# Patient Record
Sex: FEMALE | Race: WHITE
[De-identification: names, ages, dates, MRNs, and addresses within clinical notes are randomized per-mention and may not be internally consistent; named-entity substitution may affect disease eponyms.]

---

## 2017-01-30 ENCOUNTER — HOSPITAL ENCOUNTER (EMERGENCY)
Dept: HOSPITAL 62 - ER | Age: 82
Discharge: HOME | End: 2017-01-30
Payer: MEDICARE

## 2017-01-30 VITALS — DIASTOLIC BLOOD PRESSURE: 82 MMHG | SYSTOLIC BLOOD PRESSURE: 160 MMHG

## 2017-01-30 DIAGNOSIS — M79.602: ICD-10-CM

## 2017-01-30 DIAGNOSIS — Z95.2: ICD-10-CM

## 2017-01-30 DIAGNOSIS — Z95.810: ICD-10-CM

## 2017-01-30 DIAGNOSIS — I25.2: ICD-10-CM

## 2017-01-30 DIAGNOSIS — I50.9: ICD-10-CM

## 2017-01-30 DIAGNOSIS — Z85.528: ICD-10-CM

## 2017-01-30 DIAGNOSIS — R07.89: Primary | ICD-10-CM

## 2017-01-30 DIAGNOSIS — E11.9: ICD-10-CM

## 2017-01-30 DIAGNOSIS — I11.0: ICD-10-CM

## 2017-01-30 DIAGNOSIS — E66.9: ICD-10-CM

## 2017-01-30 DIAGNOSIS — E78.00: ICD-10-CM

## 2017-01-30 DIAGNOSIS — Z88.2: ICD-10-CM

## 2017-01-30 DIAGNOSIS — M25.512: ICD-10-CM

## 2017-01-30 LAB
ALBUMIN SERPL-MCNC: 3.4 G/DL (ref 3.5–5)
ALP SERPL-CCNC: 112 U/L (ref 38–126)
ALT SERPL-CCNC: 18 U/L (ref 9–52)
ANION GAP SERPL CALC-SCNC: 11 MMOL/L (ref 5–19)
AST SERPL-CCNC: 18 U/L (ref 14–36)
BASOPHILS # BLD AUTO: 0 10^3/UL (ref 0–0.2)
BASOPHILS NFR BLD AUTO: 0.2 % (ref 0–2)
BILIRUB DIRECT SERPL-MCNC: 0 MG/DL (ref 0–0.3)
BILIRUB SERPL-MCNC: 0.7 MG/DL (ref 0.2–1.3)
BUN SERPL-MCNC: 22 MG/DL (ref 7–20)
CALCIUM: 8.7 MG/DL (ref 8.4–10.2)
CHLORIDE SERPL-SCNC: 105 MMOL/L (ref 98–107)
CK MB SERPL-MCNC: 0.94 NG/ML (ref ?–4.55)
CK SERPL-CCNC: 48 U/L (ref 30–135)
CO2 SERPL-SCNC: 25 MMOL/L (ref 22–30)
CREAT SERPL-MCNC: 1.86 MG/DL (ref 0.52–1.25)
EOSINOPHIL # BLD AUTO: 0.2 10^3/UL (ref 0–0.6)
EOSINOPHIL NFR BLD AUTO: 3.1 % (ref 0–6)
ERYTHROCYTE [DISTWIDTH] IN BLOOD BY AUTOMATED COUNT: 14 % (ref 11.5–14)
GLUCOSE SERPL-MCNC: 99 MG/DL (ref 75–110)
HCT VFR BLD CALC: 41.6 % (ref 36–47)
HGB BLD-MCNC: 13.5 G/DL (ref 12–15.5)
HGB HCT DIFFERENCE: -1.1
LYMPHOCYTES # BLD AUTO: 1.7 10^3/UL (ref 0.5–4.7)
LYMPHOCYTES NFR BLD AUTO: 27.7 % (ref 13–45)
MCH RBC QN AUTO: 28.2 PG (ref 27–33.4)
MCHC RBC AUTO-ENTMCNC: 32.5 G/DL (ref 32–36)
MCV RBC AUTO: 87 FL (ref 80–97)
MONOCYTES # BLD AUTO: 0.5 10^3/UL (ref 0.1–1.4)
MONOCYTES NFR BLD AUTO: 7.4 % (ref 3–13)
NEUTROPHILS # BLD AUTO: 3.8 10^3/UL (ref 1.7–8.2)
NEUTS SEG NFR BLD AUTO: 61.6 % (ref 42–78)
POTASSIUM SERPL-SCNC: 4.5 MMOL/L (ref 3.6–5)
PROT SERPL-MCNC: 7.1 G/DL (ref 6.3–8.2)
RBC # BLD AUTO: 4.8 10^6/UL (ref 3.72–5.28)
SODIUM SERPL-SCNC: 141.2 MMOL/L (ref 137–145)
TROPONIN I SERPL-MCNC: < 0.012 NG/ML
WBC # BLD AUTO: 6.2 10^3/UL (ref 4–10.5)

## 2017-01-30 PROCEDURE — 93010 ELECTROCARDIOGRAM REPORT: CPT

## 2017-01-30 PROCEDURE — 85025 COMPLETE CBC W/AUTO DIFF WBC: CPT

## 2017-01-30 PROCEDURE — 36415 COLL VENOUS BLD VENIPUNCTURE: CPT

## 2017-01-30 PROCEDURE — 84484 ASSAY OF TROPONIN QUANT: CPT

## 2017-01-30 PROCEDURE — 82550 ASSAY OF CK (CPK): CPT

## 2017-01-30 PROCEDURE — 82553 CREATINE MB FRACTION: CPT

## 2017-01-30 PROCEDURE — 93005 ELECTROCARDIOGRAM TRACING: CPT

## 2017-01-30 PROCEDURE — 71010: CPT

## 2017-01-30 PROCEDURE — 80053 COMPREHEN METABOLIC PANEL: CPT

## 2017-01-30 PROCEDURE — 99285 EMERGENCY DEPT VISIT HI MDM: CPT

## 2017-01-31 NOTE — EKG REPORT
SEVERITY:- ABNORMAL ECG -

ATRIAL-SENSED VENTRICULAR-PACED RHYTHM

:

Confirmed by: Jose Mattson 31-Jan-2017 00:02:59

## 2019-01-13 ENCOUNTER — HOSPITAL ENCOUNTER (INPATIENT)
Dept: HOSPITAL 62 - ER | Age: 84
LOS: 2 days | Discharge: HOME HEALTH | DRG: 872 | End: 2019-01-15
Attending: INTERNAL MEDICINE | Admitting: INTERNAL MEDICINE
Payer: MEDICARE

## 2019-01-13 DIAGNOSIS — N18.4: ICD-10-CM

## 2019-01-13 DIAGNOSIS — Z90.5: ICD-10-CM

## 2019-01-13 DIAGNOSIS — R19.7: ICD-10-CM

## 2019-01-13 DIAGNOSIS — R41.82: ICD-10-CM

## 2019-01-13 DIAGNOSIS — I50.22: ICD-10-CM

## 2019-01-13 DIAGNOSIS — I13.0: ICD-10-CM

## 2019-01-13 DIAGNOSIS — E86.0: ICD-10-CM

## 2019-01-13 DIAGNOSIS — N39.0: ICD-10-CM

## 2019-01-13 DIAGNOSIS — Z66: ICD-10-CM

## 2019-01-13 DIAGNOSIS — E87.5: ICD-10-CM

## 2019-01-13 DIAGNOSIS — I25.2: ICD-10-CM

## 2019-01-13 DIAGNOSIS — E11.22: ICD-10-CM

## 2019-01-13 DIAGNOSIS — N17.9: ICD-10-CM

## 2019-01-13 DIAGNOSIS — Z85.528: ICD-10-CM

## 2019-01-13 DIAGNOSIS — E78.5: ICD-10-CM

## 2019-01-13 DIAGNOSIS — Z95.0: ICD-10-CM

## 2019-01-13 DIAGNOSIS — A41.9: Primary | ICD-10-CM

## 2019-01-13 LAB
A TYPE INFLUENZA AG: NEGATIVE
ADD MANUAL DIFF: NO
ALBUMIN SERPL-MCNC: 3.9 G/DL (ref 3.5–5)
ALP SERPL-CCNC: 91 U/L (ref 38–126)
ALT SERPL-CCNC: 91 U/L (ref 9–52)
ANION GAP SERPL CALC-SCNC: 10 MMOL/L (ref 5–19)
ANION GAP SERPL CALC-SCNC: 13 MMOL/L (ref 5–19)
APAP SERPL-MCNC: < 10 UG/ML (ref 10–30)
APPEARANCE UR: (no result)
APTT PPP: YELLOW S
AST SERPL-CCNC: 117 U/L (ref 14–36)
B INFLUENZA AG: NEGATIVE
BARBITURATES UR QL SCN: NEGATIVE
BASE EXCESS BLDV CALC-SCNC: -7.1 MMOL/L
BASOPHILS # BLD AUTO: 0 10^3/UL (ref 0–0.2)
BASOPHILS NFR BLD AUTO: 0.4 % (ref 0–2)
BILIRUB DIRECT SERPL-MCNC: 0.5 MG/DL (ref 0–0.4)
BILIRUB SERPL-MCNC: 1.5 MG/DL (ref 0.2–1.3)
BILIRUB UR QL STRIP: NEGATIVE
BUN SERPL-MCNC: 37 MG/DL (ref 7–20)
BUN SERPL-MCNC: 38 MG/DL (ref 7–20)
CALCIUM: 8.1 MG/DL (ref 8.4–10.2)
CALCIUM: 9.2 MG/DL (ref 8.4–10.2)
CHLORIDE SERPL-SCNC: 108 MMOL/L (ref 98–107)
CHLORIDE SERPL-SCNC: 109 MMOL/L (ref 98–107)
CK SERPL-CCNC: 58 U/L (ref 30–135)
CO2 SERPL-SCNC: 17 MMOL/L (ref 22–30)
CO2 SERPL-SCNC: 20 MMOL/L (ref 22–30)
EOSINOPHIL # BLD AUTO: 0 10^3/UL (ref 0–0.6)
EOSINOPHIL NFR BLD AUTO: 0.1 % (ref 0–6)
ERYTHROCYTE [DISTWIDTH] IN BLOOD BY AUTOMATED COUNT: 14.3 % (ref 11.5–14)
ETHANOL SERPL-MCNC: < 10 MG/DL
GLUCOSE SERPL-MCNC: 169 MG/DL (ref 75–110)
GLUCOSE SERPL-MCNC: 221 MG/DL (ref 75–110)
GLUCOSE UR STRIP-MCNC: NEGATIVE MG/DL
HCO3 BLDV-SCNC: 18 MMOL/L (ref 20–32)
HCT VFR BLD CALC: 42.7 % (ref 36–47)
HGB BLD-MCNC: 14 G/DL (ref 12–15.5)
INR PPP: 1.11
KETONES UR STRIP-MCNC: NEGATIVE MG/DL
LYMPHOCYTES # BLD AUTO: 1 10^3/UL (ref 0.5–4.7)
LYMPHOCYTES NFR BLD AUTO: 17.5 % (ref 13–45)
MCH RBC QN AUTO: 29.4 PG (ref 27–33.4)
MCHC RBC AUTO-ENTMCNC: 32.8 G/DL (ref 32–36)
MCV RBC AUTO: 90 FL (ref 80–97)
METHADONE UR QL SCN: NEGATIVE
MONOCYTES # BLD AUTO: 0.4 10^3/UL (ref 0.1–1.4)
MONOCYTES NFR BLD AUTO: 7.4 % (ref 3–13)
NEUTROPHILS # BLD AUTO: 4.4 10^3/UL (ref 1.7–8.2)
NEUTS SEG NFR BLD AUTO: 74.6 % (ref 42–78)
NITRITE UR QL STRIP: NEGATIVE
PCO2 BLDV: 35.1 MMHG (ref 35–63)
PCP UR QL SCN: NEGATIVE
PH BLDV: 7.33 [PH] (ref 7.3–7.42)
PH UR STRIP: 5 [PH] (ref 5–9)
PLATELET # BLD: 170 10^3/UL (ref 150–450)
POTASSIUM SERPL-SCNC: 3.9 MMOL/L (ref 3.6–5)
POTASSIUM SERPL-SCNC: 6.1 MMOL/L (ref 3.6–5)
PROT SERPL-MCNC: 6.8 G/DL (ref 6.3–8.2)
PROT UR STRIP-MCNC: NEGATIVE MG/DL
PROTHROMBIN TIME: 14.9 SEC (ref 11.4–15.4)
RBC # BLD AUTO: 4.76 10^6/UL (ref 3.72–5.28)
SALICYLATES SERPL-MCNC: < 1 MG/DL (ref 2–20)
SODIUM SERPL-SCNC: 137.6 MMOL/L (ref 137–145)
SODIUM SERPL-SCNC: 139.4 MMOL/L (ref 137–145)
SP GR UR STRIP: 1.01
TOTAL CELLS COUNTED % (AUTO): 100 %
TROPONIN I SERPL-MCNC: 0.06 NG/ML
URINE AMPHETAMINES SCREEN: NEGATIVE
URINE BENZODIAZEPINES SCREEN: NEGATIVE
URINE COCAINE SCREEN: NEGATIVE
URINE MARIJUANA (THC) SCREEN: NEGATIVE
UROBILINOGEN UR-MCNC: NEGATIVE MG/DL (ref ?–2)
WBC # BLD AUTO: 5.9 10^3/UL (ref 4–10.5)

## 2019-01-13 PROCEDURE — 70450 CT HEAD/BRAIN W/O DYE: CPT

## 2019-01-13 PROCEDURE — 87804 INFLUENZA ASSAY W/OPTIC: CPT

## 2019-01-13 PROCEDURE — 36415 COLL VENOUS BLD VENIPUNCTURE: CPT

## 2019-01-13 PROCEDURE — 94640 AIRWAY INHALATION TREATMENT: CPT

## 2019-01-13 PROCEDURE — 96367 TX/PROPH/DG ADDL SEQ IV INF: CPT

## 2019-01-13 PROCEDURE — 96366 THER/PROPH/DIAG IV INF ADDON: CPT

## 2019-01-13 PROCEDURE — 83880 ASSAY OF NATRIURETIC PEPTIDE: CPT

## 2019-01-13 PROCEDURE — 82962 GLUCOSE BLOOD TEST: CPT

## 2019-01-13 PROCEDURE — 96365 THER/PROPH/DIAG IV INF INIT: CPT

## 2019-01-13 PROCEDURE — 83735 ASSAY OF MAGNESIUM: CPT

## 2019-01-13 PROCEDURE — 87086 URINE CULTURE/COLONY COUNT: CPT

## 2019-01-13 PROCEDURE — 80053 COMPREHEN METABOLIC PANEL: CPT

## 2019-01-13 PROCEDURE — 96368 THER/DIAG CONCURRENT INF: CPT

## 2019-01-13 PROCEDURE — 51701 INSERT BLADDER CATHETER: CPT

## 2019-01-13 PROCEDURE — 83690 ASSAY OF LIPASE: CPT

## 2019-01-13 PROCEDURE — 99291 CRITICAL CARE FIRST HOUR: CPT

## 2019-01-13 PROCEDURE — 87186 SC STD MICRODIL/AGAR DIL: CPT

## 2019-01-13 PROCEDURE — 85610 PROTHROMBIN TIME: CPT

## 2019-01-13 PROCEDURE — 82550 ASSAY OF CK (CPK): CPT

## 2019-01-13 PROCEDURE — 81001 URINALYSIS AUTO W/SCOPE: CPT

## 2019-01-13 PROCEDURE — 84484 ASSAY OF TROPONIN QUANT: CPT

## 2019-01-13 PROCEDURE — 96375 TX/PRO/DX INJ NEW DRUG ADDON: CPT

## 2019-01-13 PROCEDURE — 87493 C DIFF AMPLIFIED PROBE: CPT

## 2019-01-13 PROCEDURE — C1751 CATH, INF, PER/CENT/MIDLINE: HCPCS

## 2019-01-13 PROCEDURE — 82803 BLOOD GASES ANY COMBINATION: CPT

## 2019-01-13 PROCEDURE — 83605 ASSAY OF LACTIC ACID: CPT

## 2019-01-13 PROCEDURE — 93005 ELECTROCARDIOGRAM TRACING: CPT

## 2019-01-13 PROCEDURE — 83036 HEMOGLOBIN GLYCOSYLATED A1C: CPT

## 2019-01-13 PROCEDURE — 80048 BASIC METABOLIC PNL TOTAL CA: CPT

## 2019-01-13 PROCEDURE — 82140 ASSAY OF AMMONIA: CPT

## 2019-01-13 PROCEDURE — 93010 ELECTROCARDIOGRAM REPORT: CPT

## 2019-01-13 PROCEDURE — 71045 X-RAY EXAM CHEST 1 VIEW: CPT

## 2019-01-13 PROCEDURE — 02HV33Z INSERTION OF INFUSION DEVICE INTO SUPERIOR VENA CAVA, PERCUTANEOUS APPROACH: ICD-10-PCS | Performed by: EMERGENCY MEDICINE

## 2019-01-13 PROCEDURE — 87088 URINE BACTERIA CULTURE: CPT

## 2019-01-13 PROCEDURE — 80307 DRUG TEST PRSMV CHEM ANLYZR: CPT

## 2019-01-13 PROCEDURE — 93306 TTE W/DOPPLER COMPLETE: CPT

## 2019-01-13 PROCEDURE — 84443 ASSAY THYROID STIM HORMONE: CPT

## 2019-01-13 PROCEDURE — 85025 COMPLETE CBC W/AUTO DIFF WBC: CPT

## 2019-01-13 PROCEDURE — 87040 BLOOD CULTURE FOR BACTERIA: CPT

## 2019-01-13 RX ADMIN — DOCUSATE SODIUM SCH: 100 CAPSULE, LIQUID FILLED ORAL at 19:24

## 2019-01-13 NOTE — ER DOCUMENT REPORT
ED General





- General


Chief Complaint: Altered Mental Status


Stated Complaint: TROUBLE BREATHING


Time Seen by Provider: 01/13/19 06:40


TRAVEL OUTSIDE OF THE [place] IN LAST 30 DAYS: No





- HPI


Patient complains to provider of: Altered mental status


Notes: 





Patient was brought in for altered mental status.  According EMS called for 

altered mental state.  Fortunately initial evaluation there is no family at 

bedside.  Patient upon my evaluation looks to be sleeping arouses to voice is 

able to answer simple yes/no questions however is confused to date time and 

year.  Patient also also repeats herself.  Patient is tachycardic upon my 

evaluation.  Patient is very adamant that she is in no pain no chest pain 

abdominal pain patient denies any fever chills nausea vomiting diarrhea.








Family later arrives and is able to add to the HPI process.  States that the 

patient has not been eating or drinking hardly like her normal self for the past

24 hours.  States the patient mostly can take care of herself at home usually is

a no x3.





A brief review of the patient's past medical records available in [facility] was 

performed





- Related Data


Allergies/Adverse Reactions: 


                                        





iodine [Iodine] Allergy (Verified 06/24/15 11:33)


   BREATHING


nitrofurantoin macrocrystalline [From Macrodantin] Allergy (Verified 06/24/15 

11:33)


   RASH


Sulfa (Sulfonamide Antibiotics) Allergy (Verified 06/24/15 11:33)


   FAINTED











Past Medical History





- Social History


Smoking Status: Unknown if Ever Smoked


Family History: Reviewed & Not Pertinent


Patient has suicidal ideation: No


Patient has homicidal ideation: No





- Past Medical History


Cardiac Medical History: Reports: Hx Congestive Heart Failure, Hx Heart Attack -

4, Hx Hypercholesterolemia, Hx Hypertension - COREG


Pulmonary Medical History: 


   Denies: Hx Asthma


Neurological Medical History: Denies: Hx Cerebrovascular Accident, Hx Seizures


Endocrine Medical History: Reports: Hx Diabetes Mellitus Type 2


Renal/ Medical History: Denies: Hx Peritoneal Dialysis


Malignancy Medical History: Reports: Hx Renal (Kidney) Cancer


GI Medical History: Denies: Hx Hepatitis, Hx Hiatal Hernia, Hx Ulcer


Musculoskeletal Medical History: Reports Hx Arthritis


Infectious Medical History: Denies: Hx Hepatitis


Past Surgical History: Reports: Hx Cardiac Surgery - pacer/defib placement, Hx 

Kidney (Renal Surgery) - Left nephrectomy, Hx Open Heart Surgery - CABG, CHF, Hx

Pacemaker.  Denies: Hx Hysterectomy, Hx Mastectomy





- Immunizations


Hx Diphtheria, Pertussis, Tetanus Vaccination: No


Hx Pneumococcal Vaccination: 01/01/12





Review of Systems





- Review of Systems


-: Yes ROS unobtainable due to patient's medical condition - Altered mental 

state





Physical Exam





- Vital signs


Vitals: 


                                        











Resp Pulse Ox


 


 22 H  95 


 


 01/13/19 06:29  01/13/19 06:29











Interpretation: Tachycardic





- General


General appearance: Appears well, Alert





- HEENT


Head: Normocephalic, Atraumatic


Eyes: Normal


Pupils: PERRL





- Respiratory


Respiratory status: No respiratory distress


Chest status: Nontender


Breath sounds: Normal


Chest palpation: Normal





- Cardiovascular


Rhythm: Tachycardia


Heart sounds: Normal auscultation


Murmur: No





- Abdominal


Inspection: Normal


Distension: No distension


Bowel sounds: Normal


Tenderness: Nontender


Organomegaly: No organomegaly





- Back


Back: Normal, Nontender





- Extremities


General upper extremity: Normal inspection, Nontender, Normal color, Normal ROM,

Normal temperature


General lower extremity: Normal inspection, Nontender, Normal color, Normal ROM,

Normal temperature, Normal weight bearing.  No: Carlos's sign





- Neurological


Neuro grossly intact: Yes


Cognition: Normal


Ricardo Coma Scale Eye Opening: Spontaneous


[place] Coma Scale Verbal: Confused


[place] Coma Scale Motor: Obeys Commands


[place] Coma Scale Total: 14


Speech: Normal


Motor strength normal: LUE, RUE, LLE, RLE


Sensory: Normal





- Psychological


Associated symptoms: Normal affect, Normal mood





- Skin


Skin Temperature: Warm


Skin Moisture: Dry


Skin Color: Normal





Course





- Re-evaluation


Re-evalutation: 





01/13/19 14:22


Ms. laboratory states that shows significant signs of hyperkalemia.  EKG did 

show upon response with widening.  Patient was initially treated with IV calcium

gluconate upon initiation of the IV calcium gluconate patient became 

significantly hypotensive with systolics in the 40s.  This was stopped patient 

reevaluated EKGs not show any acute changes again reevaluation patient denied 

any pain denies any chest pain or abdominal pain.  IV fluids were administered 

with subsequent slight improvement of her blood pressure to the 80s and hence to

systolics in the 90s.  After long discussion with the family member stated that 

they did want medical treatment at that time agreed to no intubation but did 

agree with administration of Levophed and placement of a central line.  This was

placed as noted.  Patient continued to improve her blood pressure with levo fed.

 They also continue to gently hydrate the patient as he does have a history of 

CHF.  Discussed with the hospital staff will admit the patient to general 

medical floor as after the hospitalist did discuss the patient's prognosis and 

medical conditions with the family decided to make the family member a DNR.  

Levophed was withheld.  More likely patient has underlying sepsis due to urinary

tract infection





- Vital Signs


Vital signs: 


                                        











Temp Pulse Resp BP Pulse Ox


 


 97.6 F      19   129/78 H  100 


 


 01/13/19 12:29     01/13/19 13:41  01/13/19 13:41  01/13/19 13:41














- Laboratory


Result Diagrams: 


                                 01/13/19 06:45





                                 01/13/19 09:19


Laboratory results interpreted by me: 


                                        











  01/13/19 01/13/19 01/13/19





  06:45 06:45 06:45


 


RDW  14.3 H  


 


VBG HCO3   


 


Potassium   6.1 H* 


 


Chloride   108 H 


 


Carbon Dioxide   17 L 


 


BUN   37 H 


 


Creatinine   2.92 H 


 


Est GFR ( Amer)   18 L 


 


Est GFR (Non-Af Amer)   15 L 


 


Glucose   169 H 


 


POC Glucose   


 


Calcium   


 


Total Bilirubin   1.5 H 


 


Direct Bilirubin   0.5 H 


 


AST   117 H 


 


ALT   91 H 


 


Ammonia   


 


NT-Pro-B Natriuret Pep    00103 H


 


Lipase   


 


Urine Blood   


 


Ur Leukocyte Esterase   


 


Salicylates   


 


Acetaminophen   














  01/13/19 01/13/19 01/13/19





  06:45 06:45 06:45


 


RDW   


 


VBG HCO3  18.0 L  


 


Potassium   


 


Chloride   


 


Carbon Dioxide   


 


BUN   


 


Creatinine   


 


Est GFR ( Amer)   


 


Est GFR (Non-Af Amer)   


 


Glucose   


 


POC Glucose   


 


Calcium   


 


Total Bilirubin   


 


Direct Bilirubin   


 


AST   


 


ALT   


 


Ammonia   < 8.7 L 


 


NT-Pro-B Natriuret Pep   


 


Lipase    309.9 H


 


Urine Blood   


 


Ur Leukocyte Esterase   


 


Salicylates   


 


Acetaminophen   














  01/13/19 01/13/19 01/13/19





  06:45 07:12 07:23


 


RDW   


 


VBG HCO3   


 


Potassium   


 


Chloride   


 


Carbon Dioxide   


 


BUN   


 


Creatinine   


 


Est GFR ( Amer)   


 


Est GFR (Non-Af Amer)   


 


Glucose   


 


POC Glucose    165 H


 


Calcium   


 


Total Bilirubin   


 


Direct Bilirubin   


 


AST   


 


ALT   


 


Ammonia   


 


NT-Pro-B Natriuret Pep   


 


Lipase   


 


Urine Blood   MODERATE H 


 


Ur Leukocyte Esterase   MODERATE H 


 


Salicylates  < 1.0 L  


 


Acetaminophen  < 10 L  














  01/13/19 01/13/19





  08:08 09:19


 


RDW  


 


VBG HCO3  


 


Potassium  


 


Chloride   109 H


 


Carbon Dioxide   20 L


 


BUN   38 H


 


Creatinine   2.91 H


 


Est GFR ( Amer)   18 L


 


Est GFR (Non-Af Amer)   15 L


 


Glucose   221 H


 


POC Glucose  164 H 


 


Calcium   8.1 L


 


Total Bilirubin  


 


Direct Bilirubin  


 


AST  


 


ALT  


 


Ammonia  


 


NT-Pro-B Natriuret Pep  


 


Lipase  


 


Urine Blood  


 


Ur Leukocyte Esterase  


 


Salicylates  


 


Acetaminophen  














Procedures





- Central Line


  ** Right Internal jugular


Consent obtained: Yes


Central line pre-insertion: Sterile PPE donned, Betadine prep applied, 

Chloraprep applied, Sterile drapes applied


Central line lumen type: Triple


Anesthetic type: 1% Lidocaine


mL's of anesthesia: 2


Ultrasound guided: Yes


CM at insertion site: 20


Line secured with sutures: Yes


Central line post-insertion: Blood return from lumens, Sutured, Sterile dressing

applied, Position confirmed w/ CXR


Number of attempts: 2 - Initially tried left side however had difficulty passing

the wire


Complications: No





Critical Care Note





- Critical Care Note


Total time excluding time spent on procedures (mins): 50


Comments: 





Multiple evaluation for patient with sepsis and  [id number]





Discharge





- Discharge


Clinical Impression: 


 Urinary tract infection, Coronary artery disease, Sepsis, Diabetes, Acute on 

chronic kidney disease, Congestive heart failure (CHF), Pacemaker





Condition: Fair


Disposition: ADMITTED AS INPATIENT


Admitting Provider: Hospitalist - [place]


Unit Admitted: Medical Floor

## 2019-01-13 NOTE — RADIOLOGY REPORT (SQ)
CLINICAL HISTORY:  ams 



COMPARISON: None.



TECHNIQUE: XR CHEST 1 VIEW 1/13/2019 7:12 AM CST



FINDINGS: 



The heart is moderately enlarged following sternotomy. Left AICD

is in place.Lungs are clear without consolidation, atelectasis,

mass or edema. There is no pleural effusion. There is no

pneumothorax. There are no acute osseous findings.



IMPRESSION: 



Clear lungs.

## 2019-01-13 NOTE — RADIOLOGY REPORT (SQ)
EXAM DESCRIPTION:  CHEST SINGLE VIEW



COMPLETED DATE/TIME:  1/13/2019 9:49 am



REASON FOR STUDY:  CENTRAL LINE PLACEMENT



COMPARISON:  Chest radiograph earlier the same day



EXAM PARAMETERS:  NUMBER OF VIEWS: One view.

TECHNIQUE: Single frontal radiographic view of the chest acquired.

RADIATION DOSE: NA

LIMITATIONS: None.



FINDINGS:  LUNGS AND PLEURA: No opacities, masses or pneumothorax. No pleural effusion.

MEDIASTINUM AND HILAR STRUCTURES: No masses.  Contour normal.

HEART AND VASCULAR STRUCTURES: Heart enlarged.  No overt failure.

BONES: No acute findings.

HARDWARE: New venous access catheter via right IJ approach.  Tip at cavoatrial junction.  Other cardi
ac hardware stable.

OTHER: No other significant finding.



IMPRESSION:  New venous access catheter tip at the expected location.  No pneumothorax.



TECHNICAL DOCUMENTATION:  JOB ID:  [id number]

 2011 [employer]- All Rights Reserved



Reading location - IP/workstation name: [email or web address]

## 2019-01-13 NOTE — EKG REPORT
SEVERITY:- ABNORMAL ECG -

ATRIAL-SENSED VENTRICULAR-PACED RHYTHM

:

Confirmed by: Rajendra Peng MD 13-Jan-2019 20:34:45

## 2019-01-13 NOTE — EKG REPORT
SEVERITY:- ABNORMAL ECG -

SINUS TACHYCARDIA

LEFT BUNDLE BRANCH BLOCK

:

Confirmed by: Rajendra Peng MD 13-Jan-2019 08:46:55

## 2019-01-13 NOTE — EKG REPORT
SEVERITY:- ABNORMAL ECG -

SINUS TACHYCARDIA

FIRST DEGREE AV BLOCK

LEFT BUNDLE BRANCH BLOCK

:

Confirmed by: Rajendra Peng MD 13-Jan-2019 08:46:36

## 2019-01-13 NOTE — RADIOLOGY REPORT (SQ)
EXAM DESCRIPTION:  CT HEAD WITHOUT



COMPLETED DATE/TIME:  1/13/2019 10:37 am



REASON FOR STUDY:  ams



COMPARISON:  5/4/2014



TECHNIQUE:  Axial images acquired through the brain without intravenous contrast.  Images reviewed wi
th bone, brain and subdural windows.  Additional sagittal and coronal reconstructions were generated.
 Images stored on PACS.

All CT scanners at this facility use dose modulation, iterative reconstruction, and/or weight based d
osing when appropriate to reduce radiation dose to as low as reasonably achievable (ALARA).

[facility]: Dose Right  [facility]: CareDose    [facility]: Dose Right    [facility]: Teradose 4D    [facility]: Smart Opathica



RADIATION DOSE:  CT Rad equipment meets quality standard of care and radiation dose reduction techniq
ues were employed. CTDIvol: 48.6 mGy. DLP: 905 mGy-cm.mGy.



LIMITATIONS:  None.



FINDINGS:  VENTRICLES: Prominent.

CEREBRUM: No masses.  No hemorrhage.  No midline shift.  Areas of low density in the white matter mos
t likely due to chronic micro-vascular ischemic change.  No evidence for acute infarction.  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
 parietal infarct.

CEREBELLUM: No masses.  No hemorrhage.  No alteration of density.  No evidence for acute infarction.

EXTRAAXIAL SPACES: Age-related involutional change.  No fluid collections.  No masses.

ORBITS AND GLOBE: No intra- or extraconal masses.  Normal contour of globe without masses.

CALVARIUM: No fracture.

PARANASAL SINUSES: No fluid or mucosal thickening.

SOFT TISSUES: No mass or hematoma.

OTHER: No other significant finding.



IMPRESSION:  CHRONIC CHANGES OF ATROPHY AND MICROVASCULAR ISCHEMIA.  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  NO 
ACUTE PROCESS.

EVIDENCE OF ACUTE STROKE: NO.



TECHNICAL DOCUMENTATION:  JOB ID:  [id number]

Quality ID # 436: Final reports with documentation of one or more dose reduction techniques (e.g., Au
tomated exposure control, adjustment of the mA and/or kV according to patient size, use of iterative 
reconstruction technique)

 2011 [employer]- All Rights Reserved



Reading location - IP/workstation name: [email or web address]

## 2019-01-13 NOTE — PDOC H&P
History of Present Illness


Admission Date/PCP: 


  01/13/19 12:07





  





Patient complains of: Family brought her to the ER with complaints of diarrhea 

and dehydration.


History of Present Illness: 


IRENE FREGOSO is a 87 year old female with history of congestive heart failure 

with defibrillator/pacemaker, coronary artery disease with a stent placements, 

renal carcinoma status post left kidney removal, history of hypertension and 

diabetes came to the emergency room actually brought to the emergency room after

she was less responsive and slid off the couch according to the son she also has

large loose stools this morning EMS was called brought to the emergency room for

further evaluation.  The workup was done in the ER found to have a UTI and 

potassium of 6.1 calcium gluconate was given in the ER followed by dropping the 

blood pressures patient was stopped started on Levophed and IV fluids given 

gentle IV fluids because of the underlying history of congestive heart failure 

and medical consult was called for admission.  I went to talk to the patient in 

the emergency room she is alert and awake communicating very well son is at 

bedside no confusion at all initially the ER physician thought patient was in 

altered mental status as mentioned above by the time and went to see the patient

she is back to her baseline.  Patient denies any history of fever but given the 

cough for the last 3-4 days whitish sputum no nausea no vomiting one episode of 

large loose stool this morning no headaches no falls no dizziness.  Denies any 

changes in the appetite.  She has this chronic rash on her face as per the 

family it was there for a long time may be at least few months.


Last living well situation with the patient she admitted that she does not have 

a living well but both sons have a power of  privileges.  Patient and 

son wants to be DNR/DNI the document was signed and given to the given to the 

nurse charge.





Past Medical History


Cardiac Medical History: Reports: Congestive Heart Failure, Myocardial 

Infarction - 4, Hyperlipidema, Hypertension - COREG


Pulmonary Medical History: 


   Denies: Asthma


Neurological Medical History: 


   Denies: Seizures


Endocrine Medical History: Reports: Diabetes Mellitus Type 2


Malignancy Medical History: Reports: Renal (Kidney) Cancer


GI Medical History: 


   Denies: Hepatitis, Hiatal Hernia


Musculoskeltal Medical History: Reports: Arthritis


Hematology: 


   Denies: Anemia, Sickle Cell Disease





Past Surgical History


Past Surgical History: Reports: Cholecystectomy, Pacemaker


   Denies: Amputation, Hysterectomy, Mastectomy





Social History


Smoking Status: Never Smoker


Frequency of Alcohol Use: None


Hx Recreational Drug Use: No


Hx Prescription Drug Abuse: No





- Advance Directive


Resuscitation Status: Do Not Resuscitate





Family History


Family History: Reviewed & Not Pertinent


Parental Family History Reviewed: Yes - Of the sons has a lymphoma.


Children Family History Reviewed: Yes


Sibling(s) Family History Reviewed.: Yes





Medication/Allergy


Allergies/Adverse Reactions: 


                                        





iodine [Iodine] Allergy (Verified 06/24/15 11:33)


   BREATHING


nitrofurantoin macrocrystalline [From Macrodantin] Allergy (Verified 06/24/15 

11:33)


   RASH


Sulfa (Sulfonamide Antibiotics) Allergy (Verified 06/24/15 11:33)


   FAINTED











Physical Exam


Vital Signs: 


                                        











Temp Pulse Resp BP Pulse Ox


 


 97.4 F      18   84/66 L  97 


 


 01/13/19 09:25     01/13/19 12:11  01/13/19 12:11  01/13/19 12:11








                                 Intake & Output











 01/12/19 01/13/19 01/14/19





 06:59 06:59 06:59


 


Intake Total   532


 


Balance   532


 


Weight  76.1 kg 











General appearance: PRESENT: no acute distress


Head exam: PRESENT: atraumatic


Eye exam: PRESENT: PERRLA


Mouth exam: PRESENT: dry mucosa


Neck exam: ABSENT: carotid bruit, JVD, lymphadenopathy, thyromegaly


Respiratory exam: PRESENT: clear to auscultation ernesto.  ABSENT: rales, rhonchi, 

wheezes


Cardiovascular exam: PRESENT: RRR.  ABSENT: diastolic murmur, rubs, systolic 

murmur


GI/Abdominal exam: PRESENT: normal bowel sounds, soft.  ABSENT: distended, 

guarding, mass, organolmegaly, rebound, tenderness


Extremities exam: PRESENT: full ROM.  ABSENT: calf tenderness, clubbing, pedal e

sloan


Neurological exam: PRESENT: alert, awake, oriented to person, oriented to place,

oriented to time, oriented to situation, CN II-XII grossly intact.  ABSENT: 

motor sensory deficit


Psychiatric exam: PRESENT: appropriate affect, normal mood.  ABSENT: homicidal 

ideation, suicidal ideation


Skin exam: PRESENT: rash





Results


Laboratory Results: 


                                        





                                 01/13/19 06:45 





                                 01/13/19 09:19 





                                        











  01/13/19 01/13/19 01/13/19





  06:45 06:45 06:45


 


WBC  5.9  


 


RBC  4.76  


 


Hgb  14.0  


 


Hct  42.7  


 


MCV  90  


 


MCH  29.4  


 


MCHC  32.8  


 


RDW  14.3 H  


 


Plt Count  170  


 


Seg Neutrophils %  74.6  


 


Lymphocytes %  17.5  


 


Monocytes %  7.4  


 


Eosinophils %  0.1  


 


Basophils %  0.4  


 


Absolute Neutrophils  4.4  


 


Absolute Lymphocytes  1.0  


 


Absolute Monocytes  0.4  


 


Absolute Eosinophils  0.0  


 


Absolute Basophils  0.0  


 


VBG pH   


 


VBG pCO2   


 


VBG HCO3   


 


VBG Base Excess   


 


Sodium   137.6 


 


Potassium   6.1 H* 


 


Chloride   108 H 


 


Carbon Dioxide   17 L 


 


Anion Gap   13 


 


BUN   37 H 


 


Creatinine   2.92 H 


 


Est GFR ( Amer)   18 L 


 


Est GFR (Non-Af Amer)   15 L 


 


Glucose   169 H 


 


Lactic Acid    2.1


 


Calcium   9.2 


 


Total Bilirubin   1.5 H 


 


AST   117 H 


 


ALT   91 H 


 


Alkaline Phosphatase   91 


 


Ammonia   


 


Total Protein   6.8 


 


Albumin   3.9 


 


Lipase   


 


Urine Color   


 


Urine Appearance   


 


Urine pH   


 


Ur Specific Gravity   


 


Urine Protein   


 


Urine Glucose (UA)   


 


Urine Ketones   


 


Urine Blood   


 


Urine Nitrite   


 


Ur Leukocyte Esterase   


 


Urine WBC (Auto)   


 


Urine RBC (Auto)   














  01/13/19 01/13/19 01/13/19





  06:45 06:45 06:45


 


WBC   


 


RBC   


 


Hgb   


 


Hct   


 


MCV   


 


MCH   


 


MCHC   


 


RDW   


 


Plt Count   


 


Seg Neutrophils %   


 


Lymphocytes %   


 


Monocytes %   


 


Eosinophils %   


 


Basophils %   


 


Absolute Neutrophils   


 


Absolute Lymphocytes   


 


Absolute Monocytes   


 


Absolute Eosinophils   


 


Absolute Basophils   


 


VBG pH  7.33  


 


VBG pCO2  35.1  


 


VBG HCO3  18.0 L  


 


VBG Base Excess  -7.1  


 


Sodium   


 


Potassium   


 


Chloride   


 


Carbon Dioxide   


 


Anion Gap   


 


BUN   


 


Creatinine   


 


Est GFR ( Amer)   


 


Est GFR (Non-Af Amer)   


 


Glucose   


 


Lactic Acid   


 


Calcium   


 


Total Bilirubin   


 


AST   


 


ALT   


 


Alkaline Phosphatase   


 


Ammonia   < 8.7 L 


 


Total Protein   


 


Albumin   


 


Lipase    309.9 H


 


Urine Color   


 


Urine Appearance   


 


Urine pH   


 


Ur Specific Gravity   


 


Urine Protein   


 


Urine Glucose (UA)   


 


Urine Ketones   


 


Urine Blood   


 


Urine Nitrite   


 


Ur Leukocyte Esterase   


 


Urine WBC (Auto)   


 


Urine RBC (Auto)   














  01/13/19 01/13/19 01/13/19





  07:12 09:19 11:08


 


WBC   


 


RBC   


 


Hgb   


 


Hct   


 


MCV   


 


MCH   


 


MCHC   


 


RDW   


 


Plt Count   


 


Seg Neutrophils %   


 


Lymphocytes %   


 


Monocytes %   


 


Eosinophils %   


 


Basophils %   


 


Absolute Neutrophils   


 


Absolute Lymphocytes   


 


Absolute Monocytes   


 


Absolute Eosinophils   


 


Absolute Basophils   


 


VBG pH   


 


VBG pCO2   


 


VBG HCO3   


 


VBG Base Excess   


 


Sodium   139.4 


 


Potassium   3.9  D 


 


Chloride   109 H 


 


Carbon Dioxide   20 L 


 


Anion Gap   10 


 


BUN   38 H 


 


Creatinine   2.91 H 


 


Est GFR ( Amer)   18 L 


 


Est GFR (Non-Af Amer)   15 L 


 


Glucose   221 H 


 


Lactic Acid    1.2


 


Calcium   8.1 L 


 


Total Bilirubin   


 


AST   


 


ALT   


 


Alkaline Phosphatase   


 


Ammonia   


 


Total Protein   


 


Albumin   


 


Lipase   


 


Urine Color  YELLOW  


 


Urine Appearance  SLIGHTLY-CLOUDY  


 


Urine pH  5.0  


 


Ur Specific Gravity  1.013  


 


Urine Protein  NEGATIVE  


 


Urine Glucose (UA)  NEGATIVE  


 


Urine Ketones  NEGATIVE  


 


Urine Blood  MODERATE H  


 


Urine Nitrite  NEGATIVE  


 


Ur Leukocyte Esterase  MODERATE H  


 


Urine WBC (Auto)  29  


 


Urine RBC (Auto)  10  








                                        











  01/13/19 01/13/19





  06:45 06:45


 


Creatine Kinase  58 


 


Troponin I   0.056


 


NT-Pro-B Natriuret Pep   70083 H











Impressions: 


                                        





Chest X-Ray  01/13/19 07:12


IMPRESSION: 


 


Clear lungs.


 








Head CT  01/13/19 07:13


IMPRESSION:  CHRONIC CHANGES OF ATROPHY AND MICROVASCULAR ISCHEMIA.  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 

parietal infarct.  NO ACUTE PROCESS.


EVIDENCE OF ACUTE STROKE: NO.


 














Assessment & Plan





- Diagnosis


(1) Urinary tract infection


Is this a current diagnosis for this admission?: Yes   


Plan: 


1/13/2019 plan today is to place the patient in the medical floor because she is

DNR/DNI lactic acid is going to repeat at this moment this evening initial 

lactic acid level is 1.2.  Patient was started on IV Rocephin 1 g daily and 

levofloxacin to 50 mg IV daily because of the creatinine close to 4.0.  Urine 

cultures blood cultures are pending.  Sepsis protocol was initiated.








(2) Sepsis


Is this a current diagnosis for this admission?: Yes   


Plan: 


1/13/2019 patient became hypotensive in the emergency room she was started on 

Levophed she is also became tachycardic altered mental status was noticed by the

ER physician.  So with this information patient's meets the criteria for sepsis 

protocol.  Cultures urine culture was sent and patient was started on IV 

Rocephin 1 g daily and levofloxacin to 50 mg IV daily and repeat lactic acid is 

pending.  Was started on gentle IV fluids at 50 cc/h because of the history of 

underlying congestive heart failure.








(3) Chronic kidney disease, stage 4, severely decreased GFR


Is this a current diagnosis for this admission?: Yes   


Plan: 


1/13/2019 patient has history of renal cell carcinoma with left nephrectomy.  

Creatinine was 2.9 with a GFR of 15.0 I am going to place a consult for 

nephrology to follow tomorrow.








(4) Congestive heart failure (CHF)


Is this a current diagnosis for this admission?: Yes   


Plan: 


1/13/2019 family and the patient is given the history of congestive heart 

failure with a pacemaker defibrillator we do not have any recent echocardiogram 

going to request for the echocardiogram today.








- Time


Time Spent: 50 to 70 Minutes


Critical Time spent with patient: 15-24 minutes


Medications reviewed and adjusted accordingly: Yes


Anticipated discharge: Home

## 2019-01-14 LAB
ADD MANUAL DIFF: NO
ALBUMIN SERPL-MCNC: 2.5 G/DL (ref 3.5–5)
ALP SERPL-CCNC: 59 U/L (ref 38–126)
ALT SERPL-CCNC: 242 U/L (ref 9–52)
ANION GAP SERPL CALC-SCNC: 6 MMOL/L (ref 5–19)
AST SERPL-CCNC: 252 U/L (ref 14–36)
BASOPHILS # BLD AUTO: 0 10^3/UL (ref 0–0.2)
BASOPHILS NFR BLD AUTO: 0.2 % (ref 0–2)
BILIRUB DIRECT SERPL-MCNC: 0.4 MG/DL (ref 0–0.4)
BILIRUB SERPL-MCNC: 0.6 MG/DL (ref 0.2–1.3)
BUN SERPL-MCNC: 42 MG/DL (ref 7–20)
CALCIUM: 7.9 MG/DL (ref 8.4–10.2)
CHLORIDE SERPL-SCNC: 114 MMOL/L (ref 98–107)
CO2 SERPL-SCNC: 21 MMOL/L (ref 22–30)
EOSINOPHIL # BLD AUTO: 0 10^3/UL (ref 0–0.6)
EOSINOPHIL NFR BLD AUTO: 0.1 % (ref 0–6)
ERYTHROCYTE [DISTWIDTH] IN BLOOD BY AUTOMATED COUNT: 13.9 % (ref 11.5–14)
GLUCOSE SERPL-MCNC: 87 MG/DL (ref 75–110)
HCT VFR BLD CALC: 33.1 % (ref 36–47)
HGB BLD-MCNC: 11 G/DL (ref 12–15.5)
LYMPHOCYTES # BLD AUTO: 1 10^3/UL (ref 0.5–4.7)
LYMPHOCYTES NFR BLD AUTO: 20.6 % (ref 13–45)
MCH RBC QN AUTO: 29.5 PG (ref 27–33.4)
MCHC RBC AUTO-ENTMCNC: 33.4 G/DL (ref 32–36)
MCV RBC AUTO: 89 FL (ref 80–97)
MONOCYTES # BLD AUTO: 0.3 10^3/UL (ref 0.1–1.4)
MONOCYTES NFR BLD AUTO: 5.8 % (ref 3–13)
NEUTROPHILS # BLD AUTO: 3.6 10^3/UL (ref 1.7–8.2)
NEUTS SEG NFR BLD AUTO: 73.3 % (ref 42–78)
PLATELET # BLD: 122 10^3/UL (ref 150–450)
POTASSIUM SERPL-SCNC: 4.2 MMOL/L (ref 3.6–5)
PROT SERPL-MCNC: 5 G/DL (ref 6.3–8.2)
RBC # BLD AUTO: 3.74 10^6/UL (ref 3.72–5.28)
SODIUM SERPL-SCNC: 140.7 MMOL/L (ref 137–145)
TOTAL CELLS COUNTED % (AUTO): 100 %
WBC # BLD AUTO: 5 10^3/UL (ref 4–10.5)

## 2019-01-14 RX ADMIN — CARVEDILOL SCH MG: 6.25 TABLET, FILM COATED ORAL at 09:58

## 2019-01-14 RX ADMIN — ENOXAPARIN SODIUM SCH: 30 INJECTION SUBCUTANEOUS at 10:00

## 2019-01-14 RX ADMIN — CEFTRIAXONE SCH MLS/HR: 1 INJECTION, SOLUTION INTRAVENOUS at 10:11

## 2019-01-14 RX ADMIN — BISACODYL PRN MG: 5 TABLET, COATED ORAL at 10:00

## 2019-01-14 RX ADMIN — LEVOFLOXACIN SCH MLS/HR: 250 INJECTION, SOLUTION INTRAVENOUS at 10:01

## 2019-01-14 RX ADMIN — GUAIFENESIN SCH MG: 600 TABLET, EXTENDED RELEASE ORAL at 21:40

## 2019-01-14 RX ADMIN — DOCUSATE SODIUM SCH MG: 100 CAPSULE, LIQUID FILLED ORAL at 09:59

## 2019-01-14 RX ADMIN — ASPIRIN SCH MG: 81 TABLET, COATED ORAL at 09:58

## 2019-01-14 RX ADMIN — DOCUSATE SODIUM SCH: 100 CAPSULE, LIQUID FILLED ORAL at 17:29

## 2019-01-14 RX ADMIN — CARVEDILOL SCH MG: 6.25 TABLET, FILM COATED ORAL at 21:39

## 2019-01-14 NOTE — XCELERA REPORT
[address] [address]

                               Tel: [phone]

                               Fax: [phone]



                      Transthoracic Echocardiogram Report

_______________________________________________________________________________



Name: IRENE FREGOSO

MRN: [id number]                           Age: 87 yrs

Gender: Female                            : 1931

Patient Status: Inpatient                 [address]

Account #: [id number]

Study Date: 2019 01:03 PM

Accession #: [id number]

_______________________________________________________________________________





_______________________________________________________________________________

Procedure: A two-dimensional transthoracic echocardiogram with color flow and

Doppler was performed. Study Quality: Poor. Poor endocarrdial visualisation.No

'true apical 2 chamber views.Poor doppler interogation.

Reason For Study: chf



History: CHF.

Ordering Physician: CHRIST NOVA

Performed By: Remigio Ross

_______________________________________________________________________________





Interpretation Summary

Poor endocarrdial visualisation.No 'true apical 2 chamber views.Poor doppler

interogation.

Of the walls seen thereis severely reduced LVEF 25% to 30%.No LVH.Mildly

diated LV size.There is a glimpse of a Pacemaer or AICD lead in the RV,There

is mild MR.Unable to interpret any other valve pathology.No True apical 2

chamber  views obtained.Hence cannot comment on the apical anterior , the

basal anterior, the basal inferior and apical inferior walls.The mid

anterior , the mid inferior and the rest of the LV walls are severely

hypoknetic.



MMode/2D Measurements & Calculations

RVDd: 1.9 cm        LVIDd: 6.0 cm FS: 15.6 %           Ao root diam: 2.8 cm

IVSd: 0.94 cm       LVIDs: 5.0 cm EDV(Teich): 177.5 ml Ao root area: 6.0 cm2

                    LVPWd: 1.1 cm ESV(Teich): 120.1 ml

                                  EF(Teich): 32.3 %    LA dimension: 2.8 cm

        _______________________________________________________________



LVOT diam: 2.2 cm

LVOT area: 3.9 cm2



Doppler Measurements & Calculations

MV E max maddie:       MV P1/2t max maddie:     Ao V2 max:       LV V1 max P.4 cm/sec        121.3 cm/sec          92.1 cm/sec      1.3 mmHg

                    MV P1/2t: 77.5 msec   Ao max PG:       LV V1 mean PG:

                                          3.8 mmHg         1.1 mmHg

                    MVA(P1/2t): 2.8 cm2   Ao V2 mean:      LV V1 max:

                    MV dec slope:         82.9 cm/sec      54.5 cm/sec

                    458.5 cm/sec2         Ao mean PG:      LV V1 mean:

                    MV dec time: 0.12 sec 3.2 mmHg         49.8 cm/sec

                                          Ao V2 VTI:       LV V1 VTI: 12.3 cm

                                          21.3 cm



                                          SOCRATES(I,D): 2.2 cm2

                                          SOCRATES(V,D): 2.3 cm2



        _______________________________________________________________

MR max maddie:         SV(LVOT): 47.7 ml     PA V2 max:       TR max maddie:

350.7 cm/sec                              71.7 cm/sec      226.2 cm/sec

MR max PG:                                PA max PG:       TR max P.2 mmHg                                 2.2 mmHg         20.5 mmHg

        _______________________________________________________________

MV P1/2t-pr_phl:

77.5 msec





Left Ventricle

Of the walls seen thereis severely reduced LVEF 25% to 30%.No LVH.Mildly

diated LV size.There is a glimpse of a Pacemaer or AICD lead in the RV,There

is mild MR.Unable to interpret any other valve pathology.No True apical 2

chamber  views obtained.Hence cannot comment on the apical anterior , the

basal anterior, the basal inferior and apical inferior walls.The mid

anterior , the mid inferior and the rest of the LV walls are severely

hypoknetic.



_______________________________________________________________________________



_______________________________________________________________________________

Electronically signed by:      Kelley Oswald      on 2019 06:26 PM



CC: CHRIST NOVA

>

Kelley Oswald

## 2019-01-14 NOTE — PDOC PROGRESS REPORT
Subjective


Progress Note for:: 01/14/19


Subjective:: 





87-year-old female admitted with hypotension sepsis and UTI that was blood 

pressure is improved to 1255/62 and T-max is 98.  Cultures are negative so far. 

Patient is able to participate in the physical therapy today.  No acute events 

in the last 24 hours.  Afebrile.  Comfortably in the bed communicating very 

well.  Complaining of slight cough requesting Mucinex.


Reason For Visit: 


SEPSIS








Physical Exam


Vital Signs: 


                                        











Temp Pulse Resp BP Pulse Ox


 


 98.0 F   73   14   125/62   96 


 


 01/14/19 08:05  01/14/19 08:05  01/14/19 08:05  01/14/19 08:05  01/14/19 08:05








                                 Intake & Output











 01/13/19 01/14/19 01/15/19





 06:59 06:59 06:59


 


Intake Total  1582 


 


Balance  1582 


 


Weight 76.1 kg 79.7 kg 











General appearance: PRESENT: no acute distress


Head exam: PRESENT: atraumatic


Eye exam: PRESENT: PERRLA


Mouth exam: PRESENT: moist


Neck exam: ABSENT: carotid bruit, JVD, lymphadenopathy, thyromegaly


Respiratory exam: PRESENT: clear to auscultation ernesto.  ABSENT: rales, rhonchi, 

wheezes


Cardiovascular exam: PRESENT: RRR.  ABSENT: diastolic murmur, rubs, systolic 

murmur


GI/Abdominal exam: PRESENT: normal bowel sounds, soft.  ABSENT: distended, 

guarding, mass, organolmegaly, rebound, tenderness


Extremities exam: PRESENT: full ROM.  ABSENT: calf tenderness, clubbing, pedal 

edema


Neurological exam: PRESENT: alert, awake, oriented to person, oriented to place,

oriented to time, oriented to situation, CN II-XII grossly intact.  ABSENT: 

motor sensory deficit


Psychiatric exam: PRESENT: appropriate affect, normal mood.  ABSENT: homicidal 

ideation, suicidal ideation





Results


Laboratory Results: 


                                        





                                 01/14/19 04:00 





                                 01/14/19 04:00 





                                        











  01/13/19 01/13/19 01/14/19





  11:08 15:02 04:00


 


WBC    5.0


 


RBC    3.74


 


Hgb    11.0 L D


 


Hct    33.1 L


 


MCV    89


 


MCH    29.5


 


MCHC    33.4


 


RDW    13.9


 


Plt Count    122 L


 


Seg Neutrophils %    73.3


 


Lymphocytes %    20.6


 


Monocytes %    5.8


 


Eosinophils %    0.1


 


Basophils %    0.2


 


Absolute Neutrophils    3.6


 


Absolute Lymphocytes    1.0


 


Absolute Monocytes    0.3


 


Absolute Eosinophils    0.0


 


Absolute Basophils    0.0


 


Sodium   


 


Potassium   


 


Chloride   


 


Carbon Dioxide   


 


Anion Gap   


 


BUN   


 


Creatinine   


 


Est GFR ( Amer)   


 


Est GFR (Non-Af Amer)   


 


Glucose   


 


Lactic Acid  1.2  0.6 L 


 


Calcium   


 


Magnesium   


 


Total Bilirubin   


 


AST   


 


ALT   


 


Alkaline Phosphatase   


 


Total Protein   


 


Albumin   


 


TSH   














  01/14/19 01/14/19





  04:00 04:00


 


WBC  


 


RBC  


 


Hgb  


 


Hct  


 


MCV  


 


MCH  


 


MCHC  


 


RDW  


 


Plt Count  


 


Seg Neutrophils %  


 


Lymphocytes %  


 


Monocytes %  


 


Eosinophils %  


 


Basophils %  


 


Absolute Neutrophils  


 


Absolute Lymphocytes  


 


Absolute Monocytes  


 


Absolute Eosinophils  


 


Absolute Basophils  


 


Sodium  140.7 


 


Potassium  4.2 


 


Chloride  114 H 


 


Carbon Dioxide  21 L 


 


Anion Gap  6 


 


BUN  42 H 


 


Creatinine  2.44 H 


 


Est GFR ( Amer)  23 L 


 


Est GFR (Non-Af Amer)  19 L 


 


Glucose  87 


 


Lactic Acid  


 


Calcium  7.9 L 


 


Magnesium  1.7 


 


Total Bilirubin  0.6 


 


AST  252 H 


 


ALT  242 H 


 


Alkaline Phosphatase  59 


 


Total Protein  5.0 L 


 


Albumin  2.5 L 


 


TSH   1.15








                                        











  01/13/19 01/13/19





  06:45 06:45


 


Creatine Kinase  58 


 


Troponin I   0.056


 


NT-Pro-B Natriuret Pep   32432 H











Impressions: 


                                        





Chest X-Ray  01/13/19 07:12


IMPRESSION: 


 


Clear lungs.


 








Head CT  01/13/19 07:13


IMPRESSION:  CHRONIC CHANGES OF ATROPHY AND MICROVASCULAR ISCHEMIA.  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 

parietal infarct.  NO ACUTE PROCESS.


EVIDENCE OF ACUTE STROKE: NO.


 














Assessment & Plan





- Diagnosis


(1) Urinary tract infection


Is this a current diagnosis for this admission?: Yes   


Plan: 


1/13/2019 plan today is to place the patient in the medical floor because she is

DNR/DNI lactic acid is going to repeat at this moment this evening initial 

lactic acid level is 1.2.  Patient was started on IV Rocephin 1 g daily and 

levofloxacin to 500 mg IV daily because of the creatinine close to 4.0.  Urine 

cultures blood cultures are pending.  Sepsis protocol was initiated.





1/14/2019 patient is getting IV Rocephin 1 g daily levofloxacin 500 mg IV daily.

 Urine cultures and blood cultures are pending.  T-max is 98 today.  Hypotension

is resolved.  Plan is to continue the present management.








(2) Sepsis


Is this a current diagnosis for this admission?: Yes   


Plan: 


1/13/2019 patient became hypotensive in the emergency room she was started on 

Levophed she is also became tachycardic altered mental status was noticed by the

ER physician.  So with this information patient's meets the criteria for sepsis 

protocol.  Cultures urine culture was sent and patient was started on IV R

ocephin 1 g daily and levofloxacin to 50 mg IV daily and repeat lactic acid is 

pending.  Was started on gentle IV fluids at 50 cc/h because of the history of 

underlying congestive heart failure.





1/14/2019-patient was admitted with sepsis hypotension tachycardia altered 

mental status.  Altered mental status is resolved hypotension is resolved 

patient's heart rate is 73 today.  On IV Rocephin 1 g daily levofloxacin 500 mg 

IV daily repeat lactic acid level is 0.6.  Plan is to continue the present 

management.








(3) Chronic kidney disease, stage 4, severely decreased GFR


Is this a current diagnosis for this admission?: Yes   


Plan: 


1/13/2019 patient has history of renal cell carcinoma with left nephrectomy.  

Creatinine was 2.9 with a GFR of 15.0 I am going to place a consult for 

nephrology to follow tomorrow.








1/14/2019 patient has history of left kidney removal secondary to renal cancer. 

Admission creatinine is 2.9 improved to 2.44 with IV fluids acute on chronic 

kidney injury is resolving.  Plan is to repeat the labs tomorrow.








(4) Congestive heart failure (CHF)


Is this a current diagnosis for this admission?: Yes   


Plan: 


1/13/2019 family and the patient is given the history of congestive heart 

failure with a pacemaker defibrillator we do not have any recent echocardiogram 

going to request for the echocardiogram today.





1/14/2019-patient has history of congestive heart failure patient is not in 

fluid overload chest was clear no pedal edema, awaiting for the echocardiogram 

report.








(5) Abnormal EKG


Is this a current diagnosis for this admission?: Yes   


Plan: 


1/14/2019 patient EKG shows first-degree heart block, tachycardia ,and left 

bundle branch block.








- Time


Time Spent with patient: 15-24 minutes


Medications reviewed and adjusted accordingly: Yes


Anticipated discharge: Home

## 2019-01-14 NOTE — PDOC CONSULTATION
Consultation


Consult Date: 19


Attending physician:: CHRIST NOVA


Consult reason:: I was asked to see this patient because of worsening kidney 

function with history of left nephrectomy previously.





History of Present Illness


Admission Date/PCP: 


  19 12:07





  





History of Present Illness: 


IRENE FREGOSO is a 87 year old female with history of coronary artery disease, 

congestive heart failure, hypertension, hyperlipidemia, renal cell carcinoma 

status post left nephrectomy many years ago who was brought in to the emergency 

room by EMS because of initial thought of unresponsiveness and possible 

dehydration with diarrhea.  Patient's caregiver Pamela is on bedside during this

evaluation.  She knows the patient because she comes to take care of her 3 times

a week.  According to her the son said that he heard a yell early morning and 

when he came to check on her in her room she was unresponsive in her recliner.  

There was also a lot of loose stools all over the place.  Her caregiver Pamela 

thought that she could be dehydrated because she has not really been drinking 

much fluids including water.  Patient was then brought into the emergency room 

and was found to have hypotension with blood pressure as low as 84/66.  Initial 

evaluation also showed urinary tract infection and possibility of sepsis.  Her 

potassium was also elevated initially at 6.1.  She also has elevated  BUN of 37,

creatinine of 2.92 with estimated GFR of 15 yesterday.  Today she has a BUN of 

42 and creatinine of 2.44 with estimated GFR of 19.  Patient was given gentle IV

fluid hydration since admission.  She was started on IV antibiotics.  For the 

hypotension he was initially given some Levoped in the emergency room which she 

is currently off now.  Her chest x-ray is negative.  Urinalysis again showed vimal

dence of UTI with moderate blood, and moderate leukocytes with urine culture now

growing gram-negative rods pending sensitivity today the patient said she is 

feeling fine and she could not remember anything at all from yesterday.  She 

denies any nausea or vomiting.  She still has a little bit of a loose stool but 

her C. difficile is negative.  Her appetite is also not the greatest.  Her 

caregiver said that she was feeling tired and has a little bit of cough last 

Friday.  Otherwise patient denies any chest pains, shortness of breath, nausea, 

vomiting nor any other complaints.  She denies any history of hepatitis nor use 

of any nonsteroidal anti-inflammatory agents.


Patient had her left nephrectomy many years ago for renal cell carcinoma.  She 

was not treated with either chemo or radiation therapy after surgery.  She has 

been following up with [facility] at least yearly being the next appointment in on 

April of this year.  From previous records here in the hospital her last blood 

work for kidney function was done around  and it looks like her creatinine 

ranges anywhere between 1.8-2.14 with estimated GFR between 22-26.  Urine output

is not currently quantified.








Past Medical History


Cardiac Medical History: Reports: CHF-Systolic, Coronary Artery Disease, 

Hyperlipidemia, Hypertension-primary, Myocardial Infarction - x 4


Endocrine Medical History: Reports: Diabetes Mellitus Type 2 - Previously 

treated but was told she no longer needs treatment for this


Renal/ Medical History: Reports: Chronic Kidney Disease Stage IV


Malignancy Medical History: Reports: Renal (Kidney) Cancer - Status post left 

nephrectomy


Musculoskeltal Medical History: Reports: Arthritis





Past Surgical History


Past Surgical History: Reports: Cholecystectomy, Nephrectomy - Left for renal 

cell carcinoma, Pacemaker





Social History


Information Source: Patient, Friend


Smoking Status: Former Smoker


Frequency of Alcohol Use: None


Hx Recreational Drug Use: No


Hx Prescription Drug Abuse: No


Past Social History Note: 





Her son lives with her





- Advance Directive


Resuscitation Status: Do Not Resuscitate





Family History


Family History: CAD - Her father  of heart attack, brother also had an MI


Parental Family History Reviewed: Yes


Children Family History Reviewed: Yes


Sibling(s) Family History Reviewed.: Yes





Medication/Allergy


Home Medications: 








Aspirin [Adult Low Dose Aspirin EC] 81 mg PO QAM 19 


Atorvastatin Calcium [Lipitor 40 mg Tablet] 40 mg PO QHS 19 


Carvedilol [Coreg 6.25 mg Tablet] 6.25 mg PO Q12 19 


Cholecalciferol (Vitamin D3) [Vitamin D3 1000 Unit Tablet] 1,000 unit PO QAM 

19 


Nitroglycerin [Nitrostat 0.4 mg (1/150 Gr) Tabs 25/Bottle] 0.4 mg SL Q5MP PRN 

19 








Allergies/Adverse Reactions: 


                                        





iodine [Iodine] Allergy (Verified 06/24/15 11:33)


   BREATHING


nitrofurantoin macrocrystalline [From Macrodantin] Allergy (Verified 06/24/15 

11:33)


   RASH


Sulfa (Sulfonamide Antibiotics) Allergy (Verified 06/24/15 11:33)


   FAINTED











Review of Systems


All systems: reviewed and no additional remarkable complaints except as stated


Review of Systems: 


Constitutional: ABSENT: chills, fatigue, fever(s), headache(s), weight gain, 

weight loss; admits decreased appetite


Eyes: ABSENT: visual disturbances


Ears: ABSENT: hearing changes


Cardiovascular: ABSENT: chest pain, dyspnea on exertion, edema, orthropnea, 

palpitations


Respiratory: ABSENT: cough, dyspnea, hemoptysis


Gastrointestinal: ABSENT: abdominal pain, constipation, hematemesis, hemat

ochezia, nausea, vomiting; admits to having loose stools


Genitourinary: ABSENT: dysuria, hematuria


Musculoskeletal: ABSENT: joint swelling


Integumentary: ABSENT: rash, wounds


Neurological: ABSENT: abnormal gait, abnormal speech, confusion, dizziness, 

focal weakness, numbness, syncope


Psychiatric: ABSENT: anxiety, depression


Endocrine: ABSENT: cold intolerance, heat intolerance, polydipsia, polyuria


Hematologic/Lymphatic: ABSENT: easy bleeding, easy bruising, lymphadenopathy





Physical Exam


Vital Signs: 


                                        











Temp Pulse Resp BP Pulse Ox


 


 98.5 F   74   18   114/51 L  98 


 


 19 11:39  19 14:00  19 11:39  19 11:39  19 11:39








                                 Intake & Output











 01/13/19 01/14/19 01/15/19





 06:59 06:59 06:59


 


Intake Total  1582 337


 


Balance  1582 337


 


Weight 76.1 kg 79.7 kg 











Exam: 





General appearance: No acute distress, cooperative, well-developed, well-

nourished


Head exam: PRESENT: atraumatic, normocephalic


Eye exam: PRESENT: Conjunctiva pale, EOMI, PERRLA.  ABSENT: conjunctival 

injection, scleral icterus


Mouth exam: PRESENT: moist, neck supple, tongue midline


Neck exam: PRESENT: full ROM.  ABSENT: carotid bruit, JVD, lymphadenopathy, 

thyromegaly


Respiratory exam: PRESENT: clear to auscultation bilaterally.  ABSENT: rales, 

rhonchi, stridor, wheezes


Cardiovascular exam: PRESENT: RRR, +S1, +S2.  ABSENT: systolic murmur


Pulses: PRESENT: normal radial pulses, normal dorsalis pedis pulses


GI/Abdominal exam: PRESENT: normal bowel sounds, soft.  ABSENT: guarding, mass, 

tenderness


Rectal exam: Deferred


Extremities exam: PRESENT: full ROM.  ABSENT: calf tenderness, pedal edema


Musculoskeletal: PRESENT: full ROM.  ABSENT: deformity


Neurological exam: PRESENT: alert, Awake, Oriented to person, Oriented to place,

Oriented to time, reflexes normal, CN II-XII grossly intact.  ABSENT: motor 

sensory deficit


Psychiatric exam: PRESENT: appropriate affect, normal mood.  ABSENT: homicidal 

ideation, suicidal ideation


Skin exam: PRESENT: intact, dry, warm.  ABSENT: rash





Results


Laboratory Results: 


                                        





                                 19 04:00 





                                 19 04:00 





                                        











  19





  04:00 04:00 04:00


 


WBC  5.0  


 


RBC  3.74  


 


Hgb  11.0 L D  


 


Hct  33.1 L  


 


MCV  89  


 


MCH  29.5  


 


MCHC  33.4  


 


RDW  13.9  


 


Plt Count  122 L  


 


Seg Neutrophils %  73.3  


 


Lymphocytes %  20.6  


 


Monocytes %  5.8  


 


Eosinophils %  0.1  


 


Basophils %  0.2  


 


Absolute Neutrophils  3.6  


 


Absolute Lymphocytes  1.0  


 


Absolute Monocytes  0.3  


 


Absolute Eosinophils  0.0  


 


Absolute Basophils  0.0  


 


Sodium   140.7 


 


Potassium   4.2 


 


Chloride   114 H 


 


Carbon Dioxide   21 L 


 


Anion Gap   6 


 


BUN   42 H 


 


Creatinine   2.44 H 


 


Est GFR ( Amer)   23 L 


 


Est GFR (Non-Af Amer)   19 L 


 


Glucose   87 


 


Calcium   7.9 L 


 


Magnesium   1.7 


 


Total Bilirubin   0.6 


 


AST   252 H 


 


ALT   242 H 


 


Alkaline Phosphatase   59 


 


Total Protein   5.0 L 


 


Albumin   2.5 L 


 


TSH    1.15








                                        











  19





  06:45 06:45


 


Creatine Kinase  58 


 


Troponin I   0.056


 


NT-Pro-B Natriuret Pep   05513 H











Impressions: 


                                        





Chest X-Ray  19 07:12


IMPRESSION: 


 


Clear lungs.


 








Head CT  19 07:13


IMPRESSION:  CHRONIC CHANGES OF ATROPHY AND MICROVASCULAR ISCHEMIA.  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 

parietal infarct.  NO ACUTE PROCESS.


EVIDENCE OF ACUTE STROKE: NO.


 














Assessment & Plan





- Diagnosis


(1) Acute kidney injury


Is this a current diagnosis for this admission?: Yes   


Plan: 


Worsening of kidney function likely secondary to prerenal azotemia secondary to 

combination of poor oral intake causing dehydration, acute diarrhea and urinary 

tract infection.  Kidney function starts improving with gentle IV fluid 

hydration.  Today I encouraged the patient to drink adequate amount of fluids 

including water and discussed the importance of it even at home.  Her IV fluids 

have been discontinued so we have to be rely on her oral fluid intake.  Patient 

seems to understand the importance of it and said that she is going to try her 

best to drink adequate amount of fluids.








(2) Chronic kidney disease, stage 4, severely decreased GFR


Is this a current diagnosis for this admission?: Yes   


Plan: 


Patient has history of left nephrectomy for renal cell carcinoma.  Discussed 

with patient that if her kidney function deteriorates treatment for that would 

be renal replacement therapy in the form of dialysis.  She states that she did 

not want to be on dialysis.  So I encouraged her to do the right thing including

adequate fluid intake, avoidance of nonsteroidal anti-inflammatory medications 

to take care of her one kidney.  She understood.








(3) Urinary tract infection


Is this a current diagnosis for this admission?: Yes   


Plan: 


Due to gram-negative rods currently on antibiotics.








(4) Sepsis


Is this a current diagnosis for this admission?: Yes   


Plan: 


Patient initially presented with hypotension and some mental status change on 

top of a urinary tract infection.  Currently improved.








(5) Diarrhea


Is this a current diagnosis for this admission?: Yes   


Plan: 


C. difficile negative.








- Notes


Notes: 





Thank you very much for this consultation.  We will follow patient with you.





- Time


Time Spent: 50 to 70 Minutes

## 2019-01-15 VITALS — SYSTOLIC BLOOD PRESSURE: 121 MMHG | DIASTOLIC BLOOD PRESSURE: 61 MMHG

## 2019-01-15 LAB
ADD MANUAL DIFF: NO
ANION GAP SERPL CALC-SCNC: 7 MMOL/L (ref 5–19)
BASOPHILS # BLD AUTO: 0 10^3/UL (ref 0–0.2)
BASOPHILS NFR BLD AUTO: 0.2 % (ref 0–2)
BUN SERPL-MCNC: 32 MG/DL (ref 7–20)
CALCIUM: 8 MG/DL (ref 8.4–10.2)
CHLORIDE SERPL-SCNC: 111 MMOL/L (ref 98–107)
CO2 SERPL-SCNC: 21 MMOL/L (ref 22–30)
EOSINOPHIL # BLD AUTO: 0 10^3/UL (ref 0–0.6)
EOSINOPHIL NFR BLD AUTO: 0.6 % (ref 0–6)
ERYTHROCYTE [DISTWIDTH] IN BLOOD BY AUTOMATED COUNT: 14 % (ref 11.5–14)
GLUCOSE SERPL-MCNC: 102 MG/DL (ref 75–110)
HCT VFR BLD CALC: 33.3 % (ref 36–47)
HGB BLD-MCNC: 11.2 G/DL (ref 12–15.5)
LYMPHOCYTES # BLD AUTO: 0.9 10^3/UL (ref 0.5–4.7)
LYMPHOCYTES NFR BLD AUTO: 18 % (ref 13–45)
MCH RBC QN AUTO: 29.6 PG (ref 27–33.4)
MCHC RBC AUTO-ENTMCNC: 33.5 G/DL (ref 32–36)
MCV RBC AUTO: 88 FL (ref 80–97)
MONOCYTES # BLD AUTO: 0.3 10^3/UL (ref 0.1–1.4)
MONOCYTES NFR BLD AUTO: 6.3 % (ref 3–13)
NEUTROPHILS # BLD AUTO: 3.7 10^3/UL (ref 1.7–8.2)
NEUTS SEG NFR BLD AUTO: 74.9 % (ref 42–78)
PLATELET # BLD: 136 10^3/UL (ref 150–450)
POTASSIUM SERPL-SCNC: 4.2 MMOL/L (ref 3.6–5)
RBC # BLD AUTO: 3.77 10^6/UL (ref 3.72–5.28)
SODIUM SERPL-SCNC: 138.8 MMOL/L (ref 137–145)
TOTAL CELLS COUNTED % (AUTO): 100 %
WBC # BLD AUTO: 4.9 10^3/UL (ref 4–10.5)

## 2019-01-15 RX ADMIN — GUAIFENESIN SCH MG: 600 TABLET, EXTENDED RELEASE ORAL at 10:01

## 2019-01-15 RX ADMIN — CEFTRIAXONE SCH MLS/HR: 1 INJECTION, SOLUTION INTRAVENOUS at 10:02

## 2019-01-15 RX ADMIN — ENOXAPARIN SODIUM SCH: 30 INJECTION SUBCUTANEOUS at 10:02

## 2019-01-15 RX ADMIN — ASPIRIN SCH MG: 81 TABLET, COATED ORAL at 10:00

## 2019-01-15 RX ADMIN — BISACODYL PRN MG: 5 TABLET, COATED ORAL at 10:01

## 2019-01-15 RX ADMIN — CARVEDILOL SCH MG: 6.25 TABLET, FILM COATED ORAL at 10:00

## 2019-01-15 RX ADMIN — LEVOFLOXACIN SCH MLS/HR: 250 INJECTION, SOLUTION INTRAVENOUS at 10:03

## 2019-01-15 RX ADMIN — DOCUSATE SODIUM SCH MG: 100 CAPSULE, LIQUID FILLED ORAL at 10:01

## 2019-01-15 NOTE — PDOC PROGRESS REPORT
Subjective


Progress Note for:: 01/15/19


Subjective:: 





Patient seems to be doing better.  She actually feels better.  It seems like she

wanted to go home.  He tells me that she is drinking more fluids.


Reason For Visit: 


SEPSIS








Physical Exam


Vital Signs: 


                                        











Temp Pulse Resp BP Pulse Ox


 


 98.6 F   78   18   138/67 H  96 


 


 01/15/19 07:43  01/15/19 07:43  01/15/19 07:43  01/15/19 07:43  01/15/19 07:43








                                 Intake & Output











 01/14/19 01/15/19 01/16/19





 06:59 06:59 06:59


 


Intake Total 1582 559 50


 


Balance 1582 559 50


 


Weight 79.7 kg 86.7 kg 











Exam: 





General appearance: PRESENT: no acute distress, cooperative, well-developed, 

well-nourished


Head exam: PRESENT: atraumatic, normocephalic


Eye exam: PRESENT: conjunctiva slightly pale, PERRLA.  ABSENT: scleral icterus


Neck exam: ABSENT: JVD


Respiratory exam: PRESENT: Normal breath sounds.  ABSENT: crackles, rales, 

rhonchi, unlabored, wheezes


Cardiovascular exam: PRESENT: Regular rate rhythm -+S1, +S2.  ABSENT: diastolic 

murmur, systolic murmur


GI/Abdominal exam: PRESENT: normal bowel sounds, soft.  ABSENT: guarding, mass, 

tenderness


Extremities exam: ABSENT: No edema


Neurological exam: PRESENT: alert, awake, oriented to person, place and time.


Skin exam: PRESENT: dry, warm,





Results


Laboratory Results: 


                                        





                                 01/15/19 06:00 





                                 01/15/19 06:00 





                                        











  01/15/19 01/15/19





  06:00 06:00


 


WBC  4.9 


 


RBC  3.77 


 


Hgb  11.2 L 


 


Hct  33.3 L 


 


MCV  88 


 


MCH  29.6 


 


MCHC  33.5 


 


RDW  14.0 


 


Plt Count  136 L 


 


Seg Neutrophils %  74.9 


 


Lymphocytes %  18.0 


 


Monocytes %  6.3 


 


Eosinophils %  0.6 


 


Basophils %  0.2 


 


Absolute Neutrophils  3.7 


 


Absolute Lymphocytes  0.9 


 


Absolute Monocytes  0.3 


 


Absolute Eosinophils  0.0 


 


Absolute Basophils  0.0 


 


Sodium   138.8


 


Potassium   4.2


 


Chloride   111 H


 


Carbon Dioxide   21 L


 


Anion Gap   7


 


BUN   32 H


 


Creatinine   1.86 H


 


Est GFR ( Amer)   31 L


 


Est GFR (Non-Af Amer)   26 L


 


Glucose   102


 


Calcium   8.0 L








                                        











  01/13/19 01/13/19





  06:45 06:45


 


Creatine Kinase  58 


 


Troponin I   0.056


 


NT-Pro-B Natriuret Pep   80942 H











Impressions: 


                                        





Chest X-Ray  01/13/19 07:12


IMPRESSION: 


 


Clear lungs.


 








Head CT  01/13/19 07:13


IMPRESSION:  CHRONIC CHANGES OF ATROPHY AND MICROVASCULAR ISCHEMIA.  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 

parietal infarct.  NO ACUTE PROCESS.


EVIDENCE OF ACUTE STROKE: NO.


 














Assessment & Plan





- Diagnosis


(1) Acute kidney injury


Is this a current diagnosis for this admission?: Yes   


Plan: 


Due to prerenal azotemia.  Currently improved and almost at baseline kidney 

function.  Reiterates to the patient the importance of adequate fluid intake wh

en she goes home.








(2) Chronic kidney disease, stage 4, severely decreased GFR


Is this a current diagnosis for this admission?: Yes   


Plan: 


Patient had left nephrectomy for renal cell carcinoma.  Currently at baseline 

kidney function.








(3) Urinary tract infection


Is this a current diagnosis for this admission?: Yes   


Plan: 


Due to Klebsiella pneumoniae sensitive to ceftriaxone.








(4) Sepsis


Is this a current diagnosis for this admission?: Yes   


Plan: 


Improved.








(5) Diarrhea


Is this a current diagnosis for this admission?: Yes   





- Notes


Notes: 





No further recommendations from nephrology standpoint.  Will sign off at this 

point.





- Time


Time with patient: 15-25 minutes

## 2019-01-15 NOTE — PDOC DISCHARGE SUMMARY
General





- Admit/Disc Date/PCP


Admission Date/Primary Care Provider: 


  01/13/19 12:07





  





Discharge Date: 01/15/19





- Discharge Diagnosis


(1) Acute diarrhea


Is this a current diagnosis for this admission?: Yes   





(2) Urinary tract infection


Is this a current diagnosis for this admission?: Yes   





- Additional Information


Resuscitation Status: Do Not Resuscitate


Discharge Diet: As Tolerated


Discharge Activity: Activity As Tolerated, Balance Activity w/Rest, No Driving, 

Energy Conservation


Prescriptions: 


Amoxicillin/Potassium Clav [Augmentin 500-125 Tablet] 1 each PO BID 5 Days #10 

tablet


Home Medications: 








Aspirin [Adult Low Dose Aspirin EC] 81 mg PO QAM 01/13/19 


Atorvastatin Calcium [Lipitor 40 mg Tablet] 40 mg PO QHS 01/13/19 


Carvedilol [Coreg 6.25 mg Tablet] 6.25 mg PO Q12 01/13/19 


Cholecalciferol (Vitamin D3) [Vitamin D3 1000 Unit Tablet] 1,000 unit PO QAM 

01/13/19 


Nitroglycerin [Nitrostat 0.4 mg (1/150 Gr) Tabs 25/Bottle] 0.4 mg SL Q5MP PRN 

01/13/19 


Amoxicillin/Potassium Clav [Augmentin 500-125 Tablet] 1 each PO BID 5 Days #10 

tablet 01/15/19 


Bisacodyl [Dulcolax 5 mg Tablet] 5 mg PO DAILY PRN  tabec 01/15/19 











History of Present Illness


History of Present Illness: 


Admitting hospitalist's H&P:





IRENE FREGOSO is a 87 year old female with history of congestive heart failure 

with defibrillator/pacemaker, coronary artery disease with a stent placements, 

renal carcinoma status post left kidney removal, history of hypertension and 

diabetes came to the emergency room actually brought to the emergency room after

she was less responsive and slid off the couch according to the son. She also 

has large loose stools this morning EMS was called brought to the emergency room

for further evaluation.  The workup was done in the ER found to have a UTI and 

low blood pressures (80/40s). 





Hospital Course


Hospital Course: 


This is a 87 year old female with history of congestive heart failure with 

defibrillator/pacemaker, coronary artery disease with a stent placements, renal 

carcinoma status post left kidney removal, history of hypertension and diabetes 

who was initially brought in due to lethargy and diarrhea.  In the ER, she was 

found to have a UTI and low blood pressures (80/40s). Patient was started on 

Levophed and IV fluids given with significant improvement of her blood 

pressures. She was only on levophed for a very short time.  She did have 

leukocytosis and was also found to have a urinary tract infection.





She was started on IV antibiotics for her UTI. Her diarrhea did resolve and 

stools came back to baseline. C diff testing was negative. She continued to 

improve clinically with no other acute issues. On day of discharge, she felt she

was back to her baseline. She will be discharged on 5 more days of PO antibiotic

s for UTI.








Physical Exam


Vital Signs: 


                                        











Temp Pulse Resp BP Pulse Ox


 


 98.7 F   83   16   121/61   97 


 


 01/15/19 13:19  01/15/19 13:19  01/15/19 13:19  01/15/19 13:19  01/15/19 13:19








                                 Intake & Output











 01/14/19 01/15/19 01/16/19





 06:59 06:59 06:59


 


Intake Total 1582 559 100


 


Balance 1582 559 100


 


Weight 175 lb 11.335 oz 191 lb 2.252 oz 











General appearance: PRESENT: no acute distress, well-developed, well-nourished


Head exam: PRESENT: atraumatic, normocephalic


Eye exam: PRESENT: conjunctiva pink, EOMI, PERRLA.  ABSENT: scleral icterus


Ear exam: PRESENT: normal external ear exam


Mouth exam: PRESENT: moist, tongue midline


Neck exam: ABSENT: carotid bruit, JVD, lymphadenopathy, thyromegaly


Respiratory exam: PRESENT: clear to auscultation ern[place].  ABSENT: rales, rhonchi, 

wheezes


Cardiovascular exam: PRESENT: RRR.  ABSENT: diastolic murmur, rubs, systolic 

murmur


Pulses: PRESENT: normal dorsalis pedis pul


GI/Abdominal exam: PRESENT: normal bowel sounds, soft.  ABSENT: distended, 

guarding, mass, organolmegaly, rebound, tenderness


Rectal exam: PRESENT: deferred


Neurological exam: PRESENT: alert, awake, oriented to person, oriented to place,

oriented to time, oriented to situation, CN II-XII grossly intact.  ABSENT: 

motor sensory deficit





Results


Laboratory Results: 


                                        





                                 01/15/19 06:00 





                                 01/15/19 06:00 





                                        











  01/15/19 01/15/19





  06:00 06:00


 


WBC  4.9 


 


RBC  3.77 


 


Hgb  11.2 L 


 


Hct  33.3 L 


 


MCV  88 


 


MCH  29.6 


 


MCHC  33.5 


 


RDW  14.0 


 


Plt Count  136 L 


 


Seg Neutrophils %  74.9 


 


Lymphocytes %  18.0 


 


Monocytes %  6.3 


 


Eosinophils %  0.6 


 


Basophils %  0.2 


 


Absolute Neutrophils  3.7 


 


Absolute Lymphocytes  0.9 


 


Absolute Monocytes  0.3 


 


Absolute Eosinophils  0.0 


 


Absolute Basophils  0.0 


 


Sodium   138.8


 


Potassium   4.2


 


Chloride   111 H


 


Carbon Dioxide   21 L


 


Anion Gap   7


 


BUN   32 H


 


Creatinine   1.86 H


 


Est GFR ( Amer)   31 L


 


Est GFR (Non-Af Amer)   26 L


 


Glucose   102


 


Calcium   8.0 L








                                        











  01/13/19 01/13/19





  06:45 06:45


 


Creatine Kinase  58 


 


Troponin I   0.056


 


NT-Pro-B Natriuret Pep   [id number] H











Impressions: 


                                        





Chest X-Ray  01/13/19 07:12


IMPRESSION: 


 


Clear lungs.


 








Head CT  01/13/19 07:13


IMPRESSION:  CHRONIC CHANGES OF ATROPHY AND MICROVASCULAR ISCHEMIA.  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 

parietal infarct.  NO ACUTE PROCESS.


EVIDENCE OF ACUTE STROKE: NO.


 














Qualifiers





- *


PATIENT BEING DISCHARGED WITH ANY OF THE FOLLOWING DIAGNOSIS: No